# Patient Record
Sex: FEMALE
[De-identification: names, ages, dates, MRNs, and addresses within clinical notes are randomized per-mention and may not be internally consistent; named-entity substitution may affect disease eponyms.]

---

## 2018-04-04 ENCOUNTER — TRANSCRIPTION ENCOUNTER (OUTPATIENT)
Age: 23
End: 2018-04-04

## 2022-03-22 PROBLEM — Z00.00 ENCOUNTER FOR PREVENTIVE HEALTH EXAMINATION: Status: ACTIVE | Noted: 2022-03-22

## 2022-06-07 ENCOUNTER — APPOINTMENT (OUTPATIENT)
Dept: GASTROENTEROLOGY | Facility: CLINIC | Age: 27
End: 2022-06-07

## 2022-07-05 ENCOUNTER — APPOINTMENT (OUTPATIENT)
Dept: GASTROENTEROLOGY | Facility: CLINIC | Age: 27
End: 2022-07-05

## 2022-07-05 DIAGNOSIS — R10.9 UNSPECIFIED ABDOMINAL PAIN: ICD-10-CM

## 2022-07-05 PROCEDURE — 99204 OFFICE O/P NEW MOD 45 MIN: CPT | Mod: 95

## 2022-07-07 NOTE — REASON FOR VISIT
[Initial Evaluation] : an initial evaluation [Home] : at home, [unfilled] , at the time of the visit. [Medical Office: ([facility])___] : at the medical office located in  [Patient] : the patient [Self] : self

## 2022-07-07 NOTE — HISTORY OF PRESENT ILLNESS
[de-identified] : 27F with ADHD on adderall, on probiotic 1.5yrs, megaspore 1x/day presents with evaluation of gi symptoms \par STRESS + DRINKING + NOT SLEEPING--> summer 2018 overgrowth of yeast in throat - diflucan and no sugar x2weeks then was fine\par summer 2021 white spots on face/nose\par then bloating\par went to  in [place]- found out was overgrowth of YEAST, especially in GUT- recommended KETO 2mo no sugar and probiotic. helped while she was on it but then had flare ups. past 6mo \par #1 BLOAT comes on quickly, hard upper stomach, pain \par #2 FATIGUE, tiredness\par no white spots currenrly. joints do swell occasionally\par BM- daily bm\par FLARES of symptoms- days that she is completely symptom free \par c/s, , some antibiotics, no food poising/travelers diarrhea, no OCP, \par FHX- pancreatic cancer in grandma, cousin with hashimoto, mom with PSORIASIS\par social- ETOH weekends/with dinner, no smoking, marijuana

## 2022-07-07 NOTE — ASSESSMENT
[FreeTextEntry1] : 27F with hx "white spots" and thrush presents with bloating, abdominal discomfort and fatigue \par - labs, stool tests\par - consider sibo test\par - counseled on diet, lifestyle, gutbrain axis\par - avoid nsaids, antibiotics unless necessary, ETOH\par - f/u after above

## 2022-07-13 ENCOUNTER — LABORATORY RESULT (OUTPATIENT)
Age: 27
End: 2022-07-13

## 2022-07-26 ENCOUNTER — APPOINTMENT (OUTPATIENT)
Dept: GASTROENTEROLOGY | Facility: CLINIC | Age: 27
End: 2022-07-26

## 2022-07-26 LAB
25(OH)D3 SERPL-MCNC: 36.2 NG/ML
ALBUMIN SERPL ELPH-MCNC: 4.5 G/DL
ALP BLD-CCNC: 44 U/L
ALT SERPL-CCNC: 13 U/L
ANA SER IF-ACNC: NEGATIVE
ANION GAP SERPL CALC-SCNC: 14 MMOL/L
ARSENIC BLD-MCNC: 3 UG/L
AST SERPL-CCNC: 16 U/L
BARLEY IGE QN: 0.14 KUA/L
BASOPHILS # BLD AUTO: 0.06 K/UL
BASOPHILS NFR BLD AUTO: 1 %
BILIRUB SERPL-MCNC: 0.4 MG/DL
BUN SERPL-MCNC: 7 MG/DL
CADMIUM SERPL-MCNC: <0.5 UG/L
CALCIUM SERPL-MCNC: 9.4 MG/DL
CALPROTECTIN FECAL: <16 UG/G
CHERRY IGE QN: <0.1 KUA/L
CHLORIDE SERPL-SCNC: 103 MMOL/L
CO2 SERPL-SCNC: 23 MMOL/L
COW MILK IGE QN: <0.1 KUA/L
CRAB IGE QN: <0.1 KUA/L
CREAT SERPL-MCNC: 0.62 MG/DL
CRP SERPL-MCNC: 6 MG/L
DEPRECATED BARLEY IGE RAST QL: NORMAL
DEPRECATED CHERRY IGE RAST QL: 0
DEPRECATED COW MILK IGE RAST QL: 0
DEPRECATED CRAB IGE RAST QL: 0
DEPRECATED EGG WHITE IGE RAST QL: 0
DEPRECATED KAPPA LC FREE/LAMBDA SER: 1.97 RATIO
DEPRECATED O AND P PREP STL: NORMAL
DEPRECATED OAT IGE RAST QL: 0
DEPRECATED PEANUT IGE RAST QL: 0
DEPRECATED RYE IGE RAST QL: 0
DEPRECATED SOYBEAN IGE RAST QL: 0
DEPRECATED WHEAT IGE RAST QL: 0
EGFR: 124 ML/MIN/1.73M2
EGG WHITE IGE QN: <0.1 KUA/L
EOSINOPHIL # BLD AUTO: 0.11 K/UL
EOSINOPHIL NFR BLD AUTO: 1.9 %
FERRITIN SERPL-MCNC: 49 NG/ML
FOLATE SERPL-MCNC: 13.6 NG/ML
GLUCOSE SERPL-MCNC: 85 MG/DL
H PYLORI AG STL QL: NEGATIVE
HCT VFR BLD CALC: 41.5 %
HGB BLD-MCNC: 13.1 G/DL
IGA SER QL IEP: 170 MG/DL
IGG SER QL IEP: 709 MG/DL
IGM SER QL IEP: 153 MG/DL
IMM GRANULOCYTES NFR BLD AUTO: 0.2 %
IRON SATN MFR SERPL: 16 %
IRON SERPL-MCNC: 49 UG/DL
KAPPA LC CSF-MCNC: 0.66 MG/DL
KAPPA LC SERPL-MCNC: 1.3 MG/DL
LEAD BLD-MCNC: 1 UG/DL
LYMPHOCYTES # BLD AUTO: 1.69 K/UL
LYMPHOCYTES NFR BLD AUTO: 29.3 %
MAN DIFF?: NORMAL
MCHC RBC-ENTMCNC: 27.8 PG
MCHC RBC-ENTMCNC: 31.6 GM/DL
MCV RBC AUTO: 88.1 FL
MERCURY BLD-MCNC: 3.7 UG/L
MONOCYTES # BLD AUTO: 0.4 K/UL
MONOCYTES NFR BLD AUTO: 6.9 %
NEUTROPHILS # BLD AUTO: 3.49 K/UL
NEUTROPHILS NFR BLD AUTO: 60.7 %
OAT IGE QN: <0.1 KUA/L
PEANUT IGE QN: <0.1 KUA/L
PLATELET # BLD AUTO: 259 K/UL
POTASSIUM SERPL-SCNC: 4.6 MMOL/L
PROT SERPL-MCNC: 6.7 G/DL
RBC # BLD: 4.71 M/UL
RBC # FLD: 13.3 %
RYE IGE QN: <0.1 KUA/L
SODIUM SERPL-SCNC: 140 MMOL/L
SOYBEAN IGE QN: <0.1 KUA/L
TIBC SERPL-MCNC: 304 UG/DL
TOTAL IGE SMQN RAST: 3 KU/L
TSH SERPL-ACNC: 1.81 UIU/ML
TTG IGA SER IA-ACNC: <1.2 U/ML
TTG IGA SER-ACNC: NEGATIVE
TTG IGG SER IA-ACNC: <1.2 U/ML
TTG IGG SER IA-ACNC: NEGATIVE
UIBC SERPL-MCNC: 255 UG/DL
VIT B12 SERPL-MCNC: 786 PG/ML
WBC # FLD AUTO: 5.76 K/UL
WHEAT IGE QN: <0.1 KUA/L
ZINC SERPL-MCNC: 86 UG/DL

## 2022-07-26 PROCEDURE — 99442: CPT

## 2022-07-28 ENCOUNTER — TRANSCRIPTION ENCOUNTER (OUTPATIENT)
Age: 27
End: 2022-07-28

## 2022-08-15 ENCOUNTER — APPOINTMENT (OUTPATIENT)
Dept: GASTROENTEROLOGY | Facility: HOSPITAL | Age: 27
End: 2022-08-15

## 2022-08-15 ENCOUNTER — RESULT REVIEW (OUTPATIENT)
Age: 27
End: 2022-08-15

## 2022-08-15 PROCEDURE — 91065 BREATH HYDROGEN/METHANE TEST: CPT | Mod: 26

## 2022-08-15 PROCEDURE — 43239 EGD BIOPSY SINGLE/MULTIPLE: CPT | Mod: 59

## 2022-08-29 ENCOUNTER — APPOINTMENT (OUTPATIENT)
Dept: GASTROENTEROLOGY | Facility: CLINIC | Age: 27
End: 2022-08-29

## 2022-08-29 PROCEDURE — 99213 OFFICE O/P EST LOW 20 MIN: CPT | Mod: 95

## 2022-08-29 NOTE — ASSESSMENT
[FreeTextEntry1] : 27F with hx "white spots" and thrush presents with bloating, abdominal discomfort and fatigue now improving off adderall and on less strict diet and with better sleep \par - repeat CRP\par - continue current diet, lifestyle\par - counseled on diet, lifestyle, gutbrain axis\par - avoid nsaids, antibiotics unless necessary, ETOH\par - f/u in 2 wks for final sifo culture via portal

## 2022-08-29 NOTE — HISTORY OF PRESENT ILLNESS
[de-identified] : 27F with ADHD on adderall, on probiotic 1.5yrs, megaspore 1x/day presents with evaluation of gi symptoms \par 8/29/22\par - hasn’t taken adderall \par - has felt much better - no gut symptoms\par - reviewed all path- WNL, dissach nml, sibo/sifo normal thus far\par \par PREVIOUS \par STRESS + DRINKING + NOT SLEEPING--> summer 2018 overgrowth of yeast in throat - diflucan and no sugar x2weeks then was fine\par summer 2021 white spots on face/nose\par then bloating\par went to  in [place]- found out was overgrowth of YEAST, especially in GUT- recommended KETO 2mo no sugar and probiotic. helped while she was on it but then had flare ups. past 6mo \par #1 BLOAT comes on quickly, hard upper stomach, pain \par #2 FATIGUE, tiredness\par no white spots currenrly. joints do swell occasionally\par BM- daily bm\par FLARES of symptoms- days that she is completely symptom free \par c/s, , some antibiotics, no food poising/travelers diarrhea, no OCP, \par FHX- pancreatic cancer in grandma, cousin with hashimoto, mom with PSORIASIS\par social- ETOH weekends/with dinner, no smoking, marijuana

## 2024-03-29 ENCOUNTER — APPOINTMENT (OUTPATIENT)
Dept: URBAN - METROPOLITAN AREA CLINIC 235 | Age: 29
Setting detail: DERMATOLOGY
End: 2024-04-02

## 2024-03-29 DIAGNOSIS — L71.0 PERIORAL DERMATITIS: ICD-10-CM

## 2024-03-29 PROCEDURE — 99203 OFFICE O/P NEW LOW 30 MIN: CPT

## 2024-03-29 PROCEDURE — OTHER COUNSELING: OTHER

## 2024-03-29 PROCEDURE — OTHER TREATMENT REGIMEN: OTHER

## 2024-03-29 PROCEDURE — OTHER PRESCRIPTION: OTHER

## 2024-03-29 RX ORDER — CLINDAMYCIN PHOSPHATE 10 MG/ML
SOLUTION TOPICAL
Qty: 60 | Refills: 11 | Status: ERX | COMMUNITY
Start: 2024-03-29

## 2024-03-29 ASSESSMENT — LOCATION DETAILED DESCRIPTION DERM
LOCATION DETAILED: LEFT INFERIOR MEDIAL MALAR CHEEK
LOCATION DETAILED: RIGHT INFERIOR MEDIAL MALAR CHEEK

## 2024-03-29 ASSESSMENT — LOCATION ZONE DERM: LOCATION ZONE: FACE

## 2024-03-29 ASSESSMENT — LOCATION SIMPLE DESCRIPTION DERM
LOCATION SIMPLE: RIGHT CHEEK
LOCATION SIMPLE: LEFT CHEEK

## 2024-03-29 NOTE — PROCEDURE: TREATMENT REGIMEN
Initiate Treatment: -clindamycin phosphate 1 % topical swab. Apply to face once daily
Detail Level: Zone

## 2024-03-29 NOTE — HPI: RASH
Is The Patient Presenting As Previously Scheduled?: Yes
How Severe Is Your Rash?: moderate
Is This A New Presentation, Or A Follow-Up?: Rash
Additional History: Used adapalene which was previously prescribed for acne at past dermatologist which was ineffective. Used a gentle cleanser for the past week which has gradually improved.

## 2024-06-05 ENCOUNTER — APPOINTMENT (OUTPATIENT)
Dept: URBAN - METROPOLITAN AREA CLINIC 235 | Age: 29
Setting detail: DERMATOLOGY
End: 2024-06-05

## 2024-06-05 DIAGNOSIS — L71.0 PERIORAL DERMATITIS: ICD-10-CM

## 2024-06-05 PROCEDURE — OTHER TREATMENT REGIMEN: OTHER

## 2024-06-05 PROCEDURE — OTHER PRESCRIPTION: OTHER

## 2024-06-05 PROCEDURE — 99213 OFFICE O/P EST LOW 20 MIN: CPT

## 2024-06-05 PROCEDURE — OTHER COUNSELING: OTHER

## 2024-06-05 RX ORDER — CLINDAMYCIN PHOSPHATE 10 MG/ML
SOLUTION TOPICAL
Qty: 60 | Refills: 11 | Status: ERX

## 2024-06-05 ASSESSMENT — LOCATION ZONE DERM: LOCATION ZONE: FACE

## 2024-06-05 ASSESSMENT — LOCATION SIMPLE DESCRIPTION DERM
LOCATION SIMPLE: LEFT CHEEK
LOCATION SIMPLE: RIGHT CHEEK

## 2024-06-05 ASSESSMENT — INVESTIGATOR STATIC GLOBAL ASSESSMENT
IN YOUR EXPERIENCE, AMONG ALL PATIENTS YOU HAVE SEEN WITH THIS CONDITION, HOW SEVERE IS THIS PATIENT'S CONDITION?: MINIMAL

## 2024-06-05 NOTE — PROCEDURE: TREATMENT REGIMEN
Initiate Treatment: -Glytone cream (glycolic acid) 5-10% to start
Detail Level: Zone
Continue Regimen: -Cerave cleanser\\n-LaRoche posay moisturizer\\n-clindamycin phosphate 1 % topical swab. Apply to face once daily
Modify Regimen: -can restart adapalene in 3 months. Would replace the glytone.

## 2024-09-03 ENCOUNTER — RX ONLY (RX ONLY)
Age: 29
End: 2024-09-03

## 2024-09-03 RX ORDER — CLINDAMYCIN PHOSPHATE 10 MG/ML
SOLUTION TOPICAL
Qty: 60 | Refills: 11 | Status: ERX